# Patient Record
Sex: FEMALE
[De-identification: names, ages, dates, MRNs, and addresses within clinical notes are randomized per-mention and may not be internally consistent; named-entity substitution may affect disease eponyms.]

---

## 2017-02-27 ENCOUNTER — CHARTING TRANS (OUTPATIENT)
Dept: OTHER | Age: 22
End: 2017-02-27

## 2017-03-01 ENCOUNTER — CHARTING TRANS (OUTPATIENT)
Dept: OTHER | Age: 22
End: 2017-03-01

## 2018-07-29 ENCOUNTER — APPOINTMENT (OUTPATIENT)
Dept: GENERAL RADIOLOGY | Age: 23
End: 2018-07-29
Attending: PHYSICIAN ASSISTANT
Payer: COMMERCIAL

## 2018-07-29 ENCOUNTER — HOSPITAL ENCOUNTER (EMERGENCY)
Age: 23
Discharge: HOME | End: 2018-07-29
Payer: COMMERCIAL

## 2018-07-29 VITALS
OXYGEN SATURATION: 99 % | RESPIRATION RATE: 18 BRPM | HEIGHT: 63 IN | BODY MASS INDEX: 35.44 KG/M2 | SYSTOLIC BLOOD PRESSURE: 119 MMHG | WEIGHT: 200 LBS | TEMPERATURE: 98 F | DIASTOLIC BLOOD PRESSURE: 74 MMHG | HEART RATE: 84 BPM

## 2018-07-29 DIAGNOSIS — M62.838 TRAPEZIUS MUSCLE SPASM: ICD-10-CM
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
## 2018-07-29 DIAGNOSIS — S63.92XA HAND SPRAIN, LEFT, INITIAL ENCOUNTER: Primary | ICD-10-CM

## 2018-07-29 DIAGNOSIS — S63.502A SPRAIN OF LEFT WRIST, INITIAL ENCOUNTER: ICD-10-CM

## 2018-07-29 PROCEDURE — 99283 EMERGENCY DEPT VISIT LOW MDM: CPT

## 2018-07-29 PROCEDURE — 73130 X-RAY EXAM OF HAND: CPT | Performed by: PHYSICIAN ASSISTANT

## 2018-07-29 PROCEDURE — 73110 X-RAY EXAM OF WRIST: CPT | Performed by: PHYSICIAN ASSISTANT

## 2018-07-29 RX ORDER — IBUPROFEN 600 MG/1
600 TABLET ORAL EVERY 8 HOURS PRN
Qty: 30 TABLET | Refills: 0 | Status: SHIPPED | OUTPATIENT
Start: 2018-07-29 | End: 2018-08-05

## 2018-07-29 RX ORDER — CYCLOBENZAPRINE HCL 10 MG
10 TABLET ORAL 3 TIMES DAILY PRN
Qty: 20 TABLET | Refills: 0 | Status: SHIPPED | OUTPATIENT
Start: 2018-07-29 | End: 2018-08-05

## 2018-07-29 RX ORDER — IBUPROFEN 600 MG/1
600 TABLET ORAL ONCE
Status: COMPLETED | OUTPATIENT
Start: 2018-07-29 | End: 2018-07-29
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
## 2020-02-08 ENCOUNTER — HOSPITAL ENCOUNTER (EMERGENCY)
Age: 25
Discharge: HOME | End: 2020-02-08
Attending: EMERGENCY MEDICINE
Payer: COMMERCIAL

## 2020-02-08 VITALS
SYSTOLIC BLOOD PRESSURE: 132 MMHG | TEMPERATURE: 99 F | DIASTOLIC BLOOD PRESSURE: 79 MMHG | OXYGEN SATURATION: 100 % | HEART RATE: 64 BPM | RESPIRATION RATE: 16 BRPM | WEIGHT: 200 LBS | BODY MASS INDEX: 35.44 KG/M2 | HEIGHT: 63 IN

## 2020-02-08 DIAGNOSIS — R51.9 FACIAL PAIN: Primary | ICD-10-CM

## 2020-02-08 LAB
POCT LOT NUMBER: NORMAL
POCT URINE PREGNANCY: NEGATIVE

## 2020-02-08 PROCEDURE — 81025 URINE PREGNANCY TEST: CPT

## 2020-02-08 PROCEDURE — 99284 EMERGENCY DEPT VISIT MOD MDM: CPT

## 2020-02-08 PROCEDURE — 96361 HYDRATE IV INFUSION ADD-ON: CPT

## 2020-02-08 PROCEDURE — 96374 THER/PROPH/DIAG INJ IV PUSH: CPT

## 2020-02-08 PROCEDURE — 96375 TX/PRO/DX INJ NEW DRUG ADDON: CPT

## 2020-02-08 RX ORDER — DEXAMETHASONE SODIUM PHOSPHATE 4 MG/ML
10 VIAL (ML) INJECTION ONCE
Status: COMPLETED | OUTPATIENT
Start: 2020-02-08 | End: 2020-02-08

## 2020-02-08 RX ORDER — KETOROLAC TROMETHAMINE 30 MG/ML
15 INJECTION, SOLUTION INTRAMUSCULAR; INTRAVENOUS ONCE
Status: COMPLETED | OUTPATIENT
Start: 2020-02-08 | End: 2020-02-08

## 2020-02-08 RX ORDER — DIPHENHYDRAMINE HYDROCHLORIDE 50 MG/ML
25 INJECTION INTRAMUSCULAR; INTRAVENOUS ONCE
Status: COMPLETED | OUTPATIENT
Start: 2020-02-08 | End: 2020-02-08

## 2020-02-08 RX ORDER — METOCLOPRAMIDE HYDROCHLORIDE 5 MG/ML
5 INJECTION INTRAMUSCULAR; INTRAVENOUS ONCE
Status: COMPLETED | OUTPATIENT
Start: 2020-02-08 | End: 2020-02-08

## 2020-02-08 RX ORDER — SODIUM CHLORIDE 9 MG/ML
125 INJECTION, SOLUTION INTRAVENOUS CONTINUOUS
Status: DISCONTINUED | OUTPATIENT
Start: 2020-02-08 | End: 2020-02-08

## 2020-02-08 NOTE — ED PROVIDER NOTES
Patient Seen in: [facility] Emergency Department In [place]      History   Patient presents with:  Headache  Eye Twitching    Stated Complaint:     HPI    This is a 25 old female presents with right-sided facial pain, eyelid twitching and headache.   Francoise bilaterally. Oropharynx is clear and moist.   Lungs: Clear to auscultation bilaterally with no rales, no retractions, and no wheezing. HEART:  Regular rate and rhythm. S1 and S2. No murmurs, no rubs or gallops.    ABDOMEN: Soft, nontender and nondistended

## 2020-02-08 NOTE — ED INITIAL ASSESSMENT (HPI)
PT to the ED for evaluation of R sided facial pain and eyelid twitching. Pt states the pain is very intense and the twitching is intermittent. PT states she knows she needs dental work but is unsure if this pain is dental in origin.

## 2021-07-27 ENCOUNTER — APPOINTMENT (OUTPATIENT)
Dept: GENERAL RADIOLOGY | Age: 26
End: 2021-07-27
Attending: EMERGENCY MEDICINE
Payer: COMMERCIAL

## 2021-07-27 ENCOUNTER — HOSPITAL ENCOUNTER (EMERGENCY)
Age: 26
Discharge: HOME | End: 2021-07-27
Attending: EMERGENCY MEDICINE
Payer: COMMERCIAL

## 2021-07-27 VITALS
RESPIRATION RATE: 16 BRPM | BODY MASS INDEX: 38.09 KG/M2 | HEIGHT: 63 IN | OXYGEN SATURATION: 99 % | DIASTOLIC BLOOD PRESSURE: 64 MMHG | HEART RATE: 72 BPM | WEIGHT: 215 LBS | SYSTOLIC BLOOD PRESSURE: 107 MMHG | TEMPERATURE: 97 F

## 2021-07-27 DIAGNOSIS — R07.9 ACUTE CHEST PAIN: Primary | ICD-10-CM

## 2021-07-27 LAB
ALBUMIN SERPL-MCNC: 3.9 G/DL (ref 3.4–5)
ALBUMIN/GLOB SERPL: 1 {RATIO} (ref 1–2)
ALP LIVER SERPL-CCNC: 90 U/L
ALT SERPL-CCNC: 41 U/L
ANION GAP SERPL CALC-SCNC: 5 MMOL/L (ref 0–18)
AST SERPL-CCNC: 42 U/L (ref 15–37)
ATRIAL RATE: 66 BPM
B-HCG UR QL: NEGATIVE
BASOPHILS # BLD AUTO: 0.03 X10(3) UL (ref 0–0.2)
BASOPHILS NFR BLD AUTO: 0.3 %
BILIRUB SERPL-MCNC: 0.5 MG/DL (ref 0.1–2)
BILIRUB UR QL STRIP.AUTO: NEGATIVE
BUN BLD-MCNC: 14 MG/DL (ref 7–18)
BUN/CREAT SERPL: 17.7 (ref 10–20)
CALCIUM BLD-MCNC: 8.4 MG/DL (ref 8.5–10.1)
CHLORIDE SERPL-SCNC: 103 MMOL/L (ref 98–112)
CO2 SERPL-SCNC: 27 MMOL/L (ref 21–32)
COLOR UR AUTO: YELLOW
CREAT BLD-MCNC: 0.79 MG/DL
D-DIMER: 0.38 UG/ML FEU (ref ?–0.5)
DEPRECATED RDW RBC AUTO: 39.7 FL (ref 35.1–46.3)
EOSINOPHIL # BLD AUTO: 0.14 X10(3) UL (ref 0–0.7)
EOSINOPHIL NFR BLD AUTO: 1.5 %
ERYTHROCYTE [DISTWIDTH] IN BLOOD BY AUTOMATED COUNT: 12.5 % (ref 11–15)
GLOBULIN PLAS-MCNC: 3.9 G/DL (ref 2.8–4.4)
GLUCOSE BLD-MCNC: 116 MG/DL (ref 70–99)
GLUCOSE UR STRIP.AUTO-MCNC: NEGATIVE MG/DL
HCT VFR BLD AUTO: 43 %
HGB BLD-MCNC: 14 G/DL
IMM GRANULOCYTES # BLD AUTO: 0.06 X10(3) UL (ref 0–1)
IMM GRANULOCYTES NFR BLD: 0.7 %
KETONES UR STRIP.AUTO-MCNC: NEGATIVE MG/DL
LIPASE SERPL-CCNC: 138 U/L (ref 73–393)
LYMPHOCYTES # BLD AUTO: 2.75 X10(3) UL (ref 1–4)
LYMPHOCYTES NFR BLD AUTO: 30 %
M PROTEIN MFR SERPL ELPH: 7.8 G/DL (ref 6.4–8.2)
MCH RBC QN AUTO: 28.5 PG (ref 26–34)
MCHC RBC AUTO-ENTMCNC: 32.6 G/DL (ref 31–37)
MCV RBC AUTO: 87.6 FL
MONOCYTES # BLD AUTO: 0.51 X10(3) UL (ref 0.1–1)
MONOCYTES NFR BLD AUTO: 5.6 %
NEUTROPHILS # BLD AUTO: 5.67 X10 (3) UL (ref 1.5–7.7)
NEUTROPHILS # BLD AUTO: 5.67 X10(3) UL (ref 1.5–7.7)
NEUTROPHILS NFR BLD AUTO: 61.9 %
NITRITE UR QL STRIP.AUTO: NEGATIVE
OSMOLALITY SERPL CALC.SUM OF ELEC: 281 MOSM/KG (ref 275–295)
P AXIS: 28 DEGREES
P-R INTERVAL: 152 MS
PH UR STRIP.AUTO: 7 [PH] (ref 5–8)
PLATELET # BLD AUTO: 301 10(3)UL (ref 150–450)
POTASSIUM SERPL-SCNC: 3.4 MMOL/L (ref 3.5–5.1)
PROT UR STRIP.AUTO-MCNC: NEGATIVE MG/DL
Q-T INTERVAL: 366 MS
QRS DURATION: 84 MS
QTC CALCULATION (BEZET): 383 MS
R AXIS: 47 DEGREES
RBC # BLD AUTO: 4.91 X10(6)UL
RBC UR QL AUTO: NEGATIVE
SARS-COV-2 RNA RESP QL NAA+PROBE: NOT DETECTED
SODIUM SERPL-SCNC: 135 MMOL/L (ref 136–145)
SP GR UR STRIP.AUTO: 1.02 (ref 1–1.03)
T AXIS: 31 DEGREES
TROPONIN I SERPL-MCNC: <0.045 NG/ML (ref ?–0.04)
UROBILINOGEN UR STRIP.AUTO-MCNC: 0.2 MG/DL
VENTRICULAR RATE: 66 BPM
WBC # BLD AUTO: 9.2 X10(3) UL (ref 4–11)

## 2021-07-27 PROCEDURE — 81025 URINE PREGNANCY TEST: CPT

## 2021-07-27 PROCEDURE — 84484 ASSAY OF TROPONIN QUANT: CPT | Performed by: EMERGENCY MEDICINE

## 2021-07-27 PROCEDURE — 93010 ELECTROCARDIOGRAM REPORT: CPT

## 2021-07-27 PROCEDURE — 99285 EMERGENCY DEPT VISIT HI MDM: CPT

## 2021-07-27 PROCEDURE — 96374 THER/PROPH/DIAG INJ IV PUSH: CPT

## 2021-07-27 PROCEDURE — 80053 COMPREHEN METABOLIC PANEL: CPT | Performed by: EMERGENCY MEDICINE

## 2021-07-27 PROCEDURE — 81015 MICROSCOPIC EXAM OF URINE: CPT | Performed by: EMERGENCY MEDICINE

## 2021-07-27 PROCEDURE — 93005 ELECTROCARDIOGRAM TRACING: CPT

## 2021-07-27 PROCEDURE — 85379 FIBRIN DEGRADATION QUANT: CPT | Performed by: EMERGENCY MEDICINE

## 2021-07-27 PROCEDURE — 81001 URINALYSIS AUTO W/SCOPE: CPT | Performed by: EMERGENCY MEDICINE

## 2021-07-27 PROCEDURE — 71045 X-RAY EXAM CHEST 1 VIEW: CPT | Performed by: EMERGENCY MEDICINE

## 2021-07-27 PROCEDURE — 83690 ASSAY OF LIPASE: CPT | Performed by: EMERGENCY MEDICINE

## 2021-07-27 PROCEDURE — 85025 COMPLETE CBC W/AUTO DIFF WBC: CPT | Performed by: EMERGENCY MEDICINE

## 2021-07-27 RX ORDER — NITROFURANTOIN 25; 75 MG/1; MG/1
100 CAPSULE ORAL 2 TIMES DAILY
Qty: 20 CAPSULE | Refills: 0 | Status: SHIPPED | OUTPATIENT
Start: 2021-07-27 | End: 2021-07-27

## 2021-07-27 RX ORDER — PANTOPRAZOLE SODIUM 40 MG/1
40 TABLET, DELAYED RELEASE ORAL
Qty: 28 TABLET | Refills: 0 | Status: SHIPPED | OUTPATIENT
Start: 2021-07-27 | End: 2021-08-10

## 2021-07-27 RX ORDER — KETOROLAC TROMETHAMINE 15 MG/ML
15 INJECTION, SOLUTION INTRAMUSCULAR; INTRAVENOUS ONCE
Status: COMPLETED | OUTPATIENT
Start: 2021-07-27 | End: 2021-07-27

## 2021-07-27 RX ORDER — ESCITALOPRAM OXALATE 10 MG/1
10 TABLET ORAL DAILY
COMMUNITY

## 2021-07-27 RX ORDER — NITROFURANTOIN 25; 75 MG/1; MG/1
100 CAPSULE ORAL 2 TIMES DAILY
Qty: 20 CAPSULE | Refills: 0 | Status: SHIPPED | OUTPATIENT
Start: 2021-07-27 | End: 2021-08-06

## 2021-07-27 RX ORDER — PANTOPRAZOLE SODIUM 40 MG/1
40 TABLET, DELAYED RELEASE ORAL
Qty: 28 TABLET | Refills: 0 | Status: SHIPPED | OUTPATIENT
Start: 2021-07-27 | End: 2021-07-27

## 2021-07-27 NOTE — ED INITIAL ASSESSMENT (HPI)
Since Saturday has had chest and back pain on right side. Worse with eating, and deep breathing.  One episode of vomiting today no carl

## 2021-07-27 NOTE — ED PROVIDER NOTES
Patient Seen in: [facility] Emergency Department In [place]      History   Patient presents with:  Back Pain  Chest Pain Angina    Stated Complaint: chest and back pain with one episode of vomiting since Saturday    HPI/Subjective:   HPI    Is a pleasant rate and rhythm  Abdomen soft nontender with no rebound tenderness noted  Extremity exam shows no clubbing cyanosis or edema  Skin exam shows no rashes or lacerations  Neuro exam shows no focal deficits  Back exam shows no tenderness    ED Course     Labs physician shows no acute disease.   Patient is low risk for ACS and thromboembolic disease patient will be discharged home is to return emerge department she symptoms with complaints is to follow-up department or call  For further evaluation

## 2021-11-21 ENCOUNTER — HOSPITAL ENCOUNTER (EMERGENCY)
Age: 26
Discharge: HOME | End: 2021-11-21
Attending: EMERGENCY MEDICINE
Payer: COMMERCIAL

## 2021-11-21 ENCOUNTER — APPOINTMENT (OUTPATIENT)
Dept: ULTRASOUND IMAGING | Age: 26
End: 2021-11-21
Attending: EMERGENCY MEDICINE
Payer: COMMERCIAL

## 2021-11-21 VITALS
SYSTOLIC BLOOD PRESSURE: 137 MMHG | RESPIRATION RATE: 18 BRPM | OXYGEN SATURATION: 98 % | HEART RATE: 74 BPM | WEIGHT: 216 LBS | BODY MASS INDEX: 38 KG/M2 | DIASTOLIC BLOOD PRESSURE: 79 MMHG | TEMPERATURE: 98 F

## 2021-11-21 DIAGNOSIS — N92.1 MENOMETRORRHAGIA: Primary | ICD-10-CM

## 2021-11-21 PROCEDURE — 81001 URINALYSIS AUTO W/SCOPE: CPT | Performed by: EMERGENCY MEDICINE

## 2021-11-21 PROCEDURE — 76856 US EXAM PELVIC COMPLETE: CPT | Performed by: EMERGENCY MEDICINE

## 2021-11-21 PROCEDURE — 85025 COMPLETE CBC W/AUTO DIFF WBC: CPT | Performed by: EMERGENCY MEDICINE

## 2021-11-21 PROCEDURE — 99284 EMERGENCY DEPT VISIT MOD MDM: CPT

## 2021-11-21 PROCEDURE — 76830 TRANSVAGINAL US NON-OB: CPT | Performed by: EMERGENCY MEDICINE

## 2021-11-21 PROCEDURE — 81025 URINE PREGNANCY TEST: CPT

## 2021-11-21 PROCEDURE — 36415 COLL VENOUS BLD VENIPUNCTURE: CPT

## 2021-11-21 PROCEDURE — 93975 VASCULAR STUDY: CPT | Performed by: EMERGENCY MEDICINE

## 2021-11-21 PROCEDURE — 99285 EMERGENCY DEPT VISIT HI MDM: CPT

## 2021-11-21 PROCEDURE — 80048 BASIC METABOLIC PNL TOTAL CA: CPT | Performed by: EMERGENCY MEDICINE

## 2021-11-21 PROCEDURE — 81015 MICROSCOPIC EXAM OF URINE: CPT | Performed by: EMERGENCY MEDICINE

## 2021-11-21 RX ORDER — TRAZODONE HYDROCHLORIDE 50 MG/1
50 TABLET ORAL NIGHTLY
COMMUNITY

## 2021-11-22 NOTE — ED PROVIDER NOTES
Patient Seen in: [facility] Department In [place]      History   Patient presents with:  Eval-G    Stated Complaint: period for 9 days now    Subjective:   HPI    27-year-old female presents to emergency room for evaluation of vaginal bleeding. Physical Exam    GENERAL: Patient is awake, alert, well-appearing, in no acute distress. HEENT: no scleral icterus. Mucous membranes are moist,  HEART: Regular rate and rhythm, no murmurs. LUNGS: Clear to auscultation bilaterally.   No Rales, no differential includes ectopic pregnancy, miscarriage, dysfunctional bleeding, fibroid, and other         MDM      Patient IV established, blood work obtained. CBC and chemistry are unremarkable. Pelvic ultrasound performed.   I discussed all results with

## 2023-08-27 ENCOUNTER — HOSPITAL ENCOUNTER (EMERGENCY)
Age: 28
Discharge: HOME | End: 2023-08-27
Attending: EMERGENCY MEDICINE
Payer: COMMERCIAL

## 2023-08-27 VITALS
RESPIRATION RATE: 18 BRPM | HEART RATE: 108 BPM | HEIGHT: 63 IN | BODY MASS INDEX: 37.21 KG/M2 | TEMPERATURE: 99 F | WEIGHT: 210 LBS | DIASTOLIC BLOOD PRESSURE: 68 MMHG | SYSTOLIC BLOOD PRESSURE: 111 MMHG | OXYGEN SATURATION: 98 %

## 2023-08-27 DIAGNOSIS — N94.89 LABIAL SWELLING: Primary | ICD-10-CM

## 2023-08-27 LAB — B-HCG UR QL: NEGATIVE

## 2023-08-27 PROCEDURE — 10160 PNXR ASPIR ABSC HMTMA BULLA: CPT

## 2023-08-27 PROCEDURE — 99284 EMERGENCY DEPT VISIT MOD MDM: CPT

## 2023-08-27 PROCEDURE — 99283 EMERGENCY DEPT VISIT LOW MDM: CPT

## 2023-08-27 PROCEDURE — 81025 URINE PREGNANCY TEST: CPT

## 2023-08-27 RX ORDER — CLINDAMYCIN HYDROCHLORIDE 300 MG/1
300 CAPSULE ORAL 4 TIMES DAILY
Qty: 28 CAPSULE | Refills: 0 | Status: SHIPPED | OUTPATIENT
Start: 2023-08-27 | End: 2023-09-03

## 2023-08-27 RX ORDER — DULOXETIN HYDROCHLORIDE 60 MG/1
60 CAPSULE, DELAYED RELEASE ORAL DAILY
COMMUNITY

## 2023-08-27 RX ORDER — CLONIDINE HYDROCHLORIDE 0.2 MG/1
0.2 TABLET ORAL 2 TIMES DAILY
COMMUNITY

## 2023-08-27 NOTE — DISCHARGE INSTRUCTIONS
Follow-up with your gynecologist or the 1 provided within the next week for reassessment. Take the antibiotic as prescribed. Return for increasing pain, fevers or any other concerning symptoms.

## 2023-09-29 PROBLEM — F41.1 GAD (GENERALIZED ANXIETY DISORDER): Status: ACTIVE | Noted: 2023-09-29

## 2023-09-29 PROBLEM — F43.10 PTSD (POST-TRAUMATIC STRESS DISORDER): Status: ACTIVE | Noted: 2023-09-29

## 2023-09-29 PROBLEM — F33.2 MDD (MAJOR DEPRESSIVE DISORDER), RECURRENT SEVERE, WITHOUT PSYCHOSIS (HCC): Status: ACTIVE | Noted: 2023-09-29

## 2023-10-09 PROBLEM — F39 UNSPECIFIED MOOD (AFFECTIVE) DISORDER: Status: ACTIVE | Noted: 2023-10-09

## 2023-10-09 PROBLEM — F10.10 ALCOHOL USE DISORDER, MILD, ABUSE: Status: ACTIVE | Noted: 2023-10-09

## 2023-10-09 PROBLEM — F12.20 CANNABIS USE DISORDER, SEVERE, DEPENDENCE (HCC): Status: ACTIVE | Noted: 2023-10-09

## 2023-10-09 PROBLEM — F39 UNSPECIFIED MOOD (AFFECTIVE) DISORDER (HCC): Status: ACTIVE | Noted: 2023-10-09

## 2023-11-03 PROBLEM — F41.9 ANXIETY DISORDER: Status: ACTIVE | Noted: 2023-11-03

## 2024-01-19 ENCOUNTER — HOSPITAL ENCOUNTER (EMERGENCY)
Age: 29
Discharge: HOME | End: 2024-01-19
Attending: EMERGENCY MEDICINE
Payer: COMMERCIAL

## 2024-01-19 VITALS
RESPIRATION RATE: 18 BRPM | HEIGHT: 63 IN | DIASTOLIC BLOOD PRESSURE: 89 MMHG | WEIGHT: 215 LBS | HEART RATE: 104 BPM | TEMPERATURE: 98 F | SYSTOLIC BLOOD PRESSURE: 128 MMHG | OXYGEN SATURATION: 99 % | BODY MASS INDEX: 38.09 KG/M2

## 2024-01-19 DIAGNOSIS — L03.012 CELLULITIS OF FINGER OF LEFT HAND: Primary | ICD-10-CM

## 2024-01-19 PROCEDURE — 99284 EMERGENCY DEPT VISIT MOD MDM: CPT

## 2024-01-19 PROCEDURE — 99283 EMERGENCY DEPT VISIT LOW MDM: CPT

## 2024-01-19 RX ORDER — CEPHALEXIN 500 MG/1
500 CAPSULE ORAL 3 TIMES DAILY
Qty: 30 CAPSULE | Refills: 0 | Status: SHIPPED | OUTPATIENT
Start: 2024-01-19 | End: 2024-01-29

## 2024-01-19 NOTE — ED PROVIDER NOTES
Patient Seen in: [facility] In [place]      History     Chief Complaint   Patient presents with    Arm or Hand Injury    Cellulitis     Stated Complaint: hand injury    Subjective:   HPI    Patient is a pleasant 28-year-old female here for evaluation of left hand redness, swelling and pain.  Patient states she was aggressively scratching her left hand on Tuesday, 3 days ago.  Over the past 2 days, patient has developed redness, swelling and \"losing dark mariza\" drainage from localized area.  Pain is rated 7 out of 10 in severity.  Patient is not diabetic.  Immunizations are up-to-date per patient report.  Patient does not bite nails.  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.    Patient denies eczema or contact with irritants, insect bite or trauma precipitating pruritic episode.     Denies fevers, chills or sweats.  Denies joint redness, warmth or tenderness.  Mild difficulty with flexion and extension of phalanx due to edema.    Objective:   Past Medical History:   Diagnosis Date    Anxiety     Depression               History reviewed. No pertinent surgical history.             Social History     Socioeconomic History    Marital status: Single   Tobacco Use    Smoking status: Never    Smokeless tobacco: Never   Vaping Use    Vaping Use: Never used   Substance and Sexual Activity    Alcohol use: Yes     Comment: occassional    Drug use: Yes     Types: Cannabis              Review of Systems    Positive for stated complaint: hand injury  Other systems are as noted in HPI.  Constitutional and vital signs reviewed.      All other systems reviewed and negative except as noted above.    Physical Exam     ED Triage Vitals [01/19/24 0828]   /89   Pulse 104   Resp 18   Temp 98.2 °F (36.8 °C)   Temp src Temporal   SpO2 99 %   O2 Device None (Room air)       Current:/89   Pulse 104   Temp 98.2 °F (36.8 °C) (Temporal)   Resp 18   Ht 160 cm (5' 3\")   Wt 97.5 kg   LMP 01/11/2024  (Exact Date)   SpO2 99%   BMI 38.09 kg/m²         Physical Exam  Vitals and nursing note reviewed.   Constitutional:       General: She is not in acute distress.     Appearance: Normal appearance. She is not ill-appearing, toxic-appearing or diaphoretic.   HENT:      Nose: No congestion.      Mouth/Throat:      Mouth: Mucous membranes are moist.   Eyes:      Conjunctiva/sclera: Conjunctivae normal.      Pupils: Pupils are equal, round, and reactive to light.   Cardiovascular:      Rate and Rhythm: Normal rate and regular rhythm.      Pulses: Normal pulses.   Pulmonary:      Effort: Pulmonary effort is normal.   Skin:     Findings: Abrasion and erythema present.      Comments: Left hand superficial abrasion as documented in photo measuring about 5 cm in width by 7 cm in length..  Surrounding cellulitis.  Moderate surrounding edema with mild erythema.  Neurovascular status intact.   Neurological:      Mental Status: She is alert.                       ED Course   Labs Reviewed - No data to display           MDM                             Medical Decision Making  Differentials include but are not limited to cellulitis, impetigo, abscess and septic joint.  Patient is well-appearing, afebrile.  There is no erythema, warmth or edema localized over specific joint.  Will plan to start Keflex and topical Bactroban for cellulitis.  Nonadhesive dressing applied with topical antibiotic ointment here and secured with Coban.  Strict follow-up precautions.  Work note given per patient request.  Patient agrees with plan of care.  All questions answered to patient's satisfaction.    Patient presentation and plan of care reviewed and formulated with Dr. Chris, emergency department physician        Disposition and Plan     Clinical Impression:  1. Cellulitis of finger of left hand         Disposition:  Discharge  1/19/2024  8:51 am    Follow-up:  [facility] in [address]  [id number]  [phone]  Follow up  As needed          Medications Prescribed:  Current Discharge Medication List        START taking these medications    Details   cephalexin 500 MG Oral Cap Take 1 capsule (500 mg total) by mouth 3 (three) times daily for 10 days.  Qty: 30 capsule, Refills: 0      mupirocin 2 % External Ointment Apply 1 Application topically 3 (three) times daily for 10 days.  Qty: 1 each, Refills: 0                                             show

## 2024-01-19 NOTE — ED INITIAL ASSESSMENT (HPI)
Pt to ed with c/o scratch to the top of her left hand that occurred 3 days ago. PT reports she scratched herself with her own nails.

## 2025-01-17 ENCOUNTER — TELEPHONE (OUTPATIENT)
Dept: OBGYN CLINIC | Facility: CLINIC | Age: 30
End: 2025-01-17

## 2025-01-17 DIAGNOSIS — N91.2 AMENORRHEA: Primary | ICD-10-CM

## 2025-01-17 NOTE — TELEPHONE ENCOUNTER
Patient calling to initiate prenatal care  LMP 12/9/24  Patient is 7-8 weeks on 2/3/25  Confirmation of pregnancy appointment scheduled on 2/18/25  Insurance [facility] PPO    Any history of ectopic pregnancy? NO  Any history of miscarriage? NO  Any medications that you are taking on a regular basis other than prenatal vitamins? DULOXETINE,GABEPEATIN, ADDEURALL, CLONAZAPEM (if not taking prenatal vitamins, encourage patient to start taking.)  Any bleeding since the first day of last LMP and your positive pregnancy test? NO  Future Appointments   Date Time Provider Department Center   2/18/2025  9:45 AM [facility] [place]   2/18/2025 10:30 AM Rabia Mccloud MD [facility] [place]   3/4/2025  3:00 PM Jelani Dennison MD [facility] [place]

## 2025-02-04 ENCOUNTER — TELEPHONE (OUTPATIENT)
Dept: OBGYN CLINIC | Facility: CLINIC | Age: 30
End: 2025-02-04

## 2025-02-04 NOTE — TELEPHONE ENCOUNTER
Pt called requesting if she can get a sooner appointment because she is spotting and has questions regarding some medication she is taking. Please advise, thank you.

## 2025-02-05 NOTE — TELEPHONE ENCOUNTER
Called and left a vm for pt. to call back and speak with nurses concerning her questions and concerns.

## 2025-04-13 ENCOUNTER — HOSPITAL ENCOUNTER (EMERGENCY)
Age: 30
Discharge: HOME | End: 2025-04-13
Attending: EMERGENCY MEDICINE
Payer: COMMERCIAL

## 2025-04-13 VITALS
OXYGEN SATURATION: 97 % | HEART RATE: 74 BPM | BODY MASS INDEX: 37.21 KG/M2 | WEIGHT: 210 LBS | DIASTOLIC BLOOD PRESSURE: 62 MMHG | RESPIRATION RATE: 16 BRPM | SYSTOLIC BLOOD PRESSURE: 100 MMHG | HEIGHT: 63 IN | TEMPERATURE: 99 F

## 2025-04-13 DIAGNOSIS — R82.71 ASYMPTOMATIC BACTERIURIA DURING PREGNANCY (HCC): ICD-10-CM

## 2025-04-13 DIAGNOSIS — O21.0 HYPEREMESIS GRAVIDARUM (HCC): Primary | ICD-10-CM

## 2025-04-13 DIAGNOSIS — O99.891 ASYMPTOMATIC BACTERIURIA DURING PREGNANCY (HCC): ICD-10-CM

## 2025-04-13 LAB
ALBUMIN SERPL-MCNC: 4.4 G/DL (ref 3.2–4.8)
ALBUMIN/GLOB SERPL: 1.4 {RATIO} (ref 1–2)
ALP LIVER SERPL-CCNC: 78 U/L (ref 37–98)
ALT SERPL-CCNC: 13 U/L (ref 10–49)
ANION GAP SERPL CALC-SCNC: 8 MMOL/L (ref 0–18)
AST SERPL-CCNC: 26 U/L (ref ?–34)
B-HCG SERPL-ACNC: ABNORMAL MIU/ML (ref ?–4.2)
B-HCG UR QL: POSITIVE
BILIRUB SERPL-MCNC: 0.5 MG/DL (ref 0.3–1.2)
BILIRUB UR QL STRIP.AUTO: NEGATIVE
BUN BLD-MCNC: 6 MG/DL (ref 9–23)
CALCIUM BLD-MCNC: 9.1 MG/DL (ref 8.7–10.6)
CHLORIDE SERPL-SCNC: 104 MMOL/L (ref 98–112)
CLARITY UR REFRACT.AUTO: CLEAR
CO2 SERPL-SCNC: 24 MMOL/L (ref 21–32)
COLOR UR AUTO: YELLOW
CREAT BLD-MCNC: 0.57 MG/DL (ref 0.55–1.02)
EGFRCR SERPLBLD CKD-EPI 2021: 126 ML/MIN/1.73M2 (ref 60–?)
GLOBULIN PLAS-MCNC: 3.2 G/DL (ref 2–3.5)
GLUCOSE BLD-MCNC: 85 MG/DL (ref 70–99)
GLUCOSE UR STRIP.AUTO-MCNC: NEGATIVE MG/DL
NITRITE UR QL STRIP.AUTO: NEGATIVE
OSMOLALITY SERPL CALC.SUM OF ELEC: 279 MOSM/KG (ref 275–295)
PH UR STRIP.AUTO: 7 [PH] (ref 5–8)
POTASSIUM SERPL-SCNC: 3.7 MMOL/L (ref 3.5–5.1)
PROT SERPL-MCNC: 7.6 G/DL (ref 5.7–8.2)
RBC UR QL AUTO: NEGATIVE
SODIUM SERPL-SCNC: 136 MMOL/L (ref 136–145)
SP GR UR STRIP.AUTO: 1.02 (ref 1–1.03)
UROBILINOGEN UR STRIP.AUTO-MCNC: 1 MG/DL

## 2025-04-13 PROCEDURE — 81001 URINALYSIS AUTO W/SCOPE: CPT | Performed by: EMERGENCY MEDICINE

## 2025-04-13 PROCEDURE — 99285 EMERGENCY DEPT VISIT HI MDM: CPT

## 2025-04-13 PROCEDURE — 96361 HYDRATE IV INFUSION ADD-ON: CPT

## 2025-04-13 PROCEDURE — 96374 THER/PROPH/DIAG INJ IV PUSH: CPT

## 2025-04-13 PROCEDURE — 81015 MICROSCOPIC EXAM OF URINE: CPT

## 2025-04-13 PROCEDURE — 87086 URINE CULTURE/COLONY COUNT: CPT

## 2025-04-13 PROCEDURE — 81025 URINE PREGNANCY TEST: CPT

## 2025-04-13 PROCEDURE — 96375 TX/PRO/DX INJ NEW DRUG ADDON: CPT

## 2025-04-13 PROCEDURE — 84702 CHORIONIC GONADOTROPIN TEST: CPT | Performed by: EMERGENCY MEDICINE

## 2025-04-13 PROCEDURE — 80053 COMPREHEN METABOLIC PANEL: CPT | Performed by: EMERGENCY MEDICINE

## 2025-04-13 PROCEDURE — 87086 URINE CULTURE/COLONY COUNT: CPT | Performed by: EMERGENCY MEDICINE

## 2025-04-13 PROCEDURE — 80053 COMPREHEN METABOLIC PANEL: CPT

## 2025-04-13 PROCEDURE — 84702 CHORIONIC GONADOTROPIN TEST: CPT

## 2025-04-13 PROCEDURE — 81001 URINALYSIS AUTO W/SCOPE: CPT

## 2025-04-13 PROCEDURE — 99284 EMERGENCY DEPT VISIT MOD MDM: CPT

## 2025-04-13 RX ORDER — ONDANSETRON 4 MG/1
4 TABLET, ORALLY DISINTEGRATING ORAL EVERY 4 HOURS PRN
Qty: 10 TABLET | Refills: 0 | Status: SHIPPED | OUTPATIENT
Start: 2025-04-13 | End: 2025-04-20

## 2025-04-13 RX ORDER — METOCLOPRAMIDE HYDROCHLORIDE 5 MG/ML
10 INJECTION INTRAMUSCULAR; INTRAVENOUS ONCE
Status: COMPLETED | OUTPATIENT
Start: 2025-04-13 | End: 2025-04-13

## 2025-04-13 RX ORDER — DIPHENHYDRAMINE HYDROCHLORIDE 50 MG/ML
25 INJECTION, SOLUTION INTRAMUSCULAR; INTRAVENOUS ONCE
Status: COMPLETED | OUTPATIENT
Start: 2025-04-13 | End: 2025-04-13

## 2025-04-13 RX ORDER — ONDANSETRON 4 MG/1
4 TABLET, ORALLY DISINTEGRATING ORAL ONCE
Status: COMPLETED | OUTPATIENT
Start: 2025-04-13 | End: 2025-04-13

## 2025-04-13 RX ORDER — CEPHALEXIN 500 MG/1
500 CAPSULE ORAL 4 TIMES DAILY
Qty: 28 CAPSULE | Refills: 0 | Status: SHIPPED | OUTPATIENT
Start: 2025-04-13 | End: 2025-04-20

## 2025-04-13 RX ORDER — DEXTROAMPHETAMINE SACCHARATE, AMPHETAMINE ASPARTATE, DEXTROAMPHETAMINE SULFATE AND AMPHETAMINE SULFATE 1.25; 1.25; 1.25; 1.25 MG/1; MG/1; MG/1; MG/1
25 TABLET ORAL DAILY
COMMUNITY

## 2025-04-13 NOTE — ED PROVIDER NOTES
Patient Seen in: [facility] In [place]    History     Chief Complaint   Patient presents with    Hyperemesis Gravidarum     Stated Complaint: vomiting x 2 weeks + preg at home last menstural in Dec    Subjective:   HPI  29-year-old female who presents to the emergency department says she is been vomiting for the past 2 weeks.  The patient says that she is pregnant at this time.  Says she is been unable to keep any fluids or food down.  She last tried to eat early this morning and vomited 10 minutes after eating.  She denies fevers or chills.  No abdominal pain.  She has been making urine.  Says she been making frequent urine.  Reviewing her records she was seen on 1/19/2024 for cellulitis of her finger of her left hand.  She did have a telephone call for prenatal care on 1/17/2025 as well as 2/4/2025.        Objective:     Past Medical History:    Anxiety    Depression              History reviewed. No pertinent surgical history.             Social History     Socioeconomic History    Marital status: Single   Tobacco Use    Smoking status: Never    Smokeless tobacco: Never   Vaping Use    Vaping status: Never Used   Substance and Sexual Activity    Alcohol use: Not Currently     Comment: occassional    Drug use: Yes     Types: Cannabis                  Physical Exam     ED Triage Vitals [04/13/25 1225]   /84   Pulse 86   Resp 18   Temp 98.6 °F (37 °C)   Temp src Temporal   SpO2 97 %   O2 Device None (Room air)       Current Vitals:   Vital Signs  BP: 112/59  Pulse: 84  Resp: 16  Temp: 99 °F (37.2 °C)  Temp src: Oral    Oxygen Therapy  SpO2: 100 %  O2 Device: None (Room air)        Physical Exam  General: This a pleasant nontoxic appearing patient in no apparent distress alert and oriented ×3  HEENT: Pupils are equal reactive to light.  Extra ocular motions are intact.  No scleral icterus or conjunctival pallor.  Head is atraumatic normocephalic.  Oral mucosa is dry..  Tongue is midline.   No posterior pharyngeal lesions.    Lungs: Clear to auscultation bilaterally.  No wheezes, rhonchi, or rales appreciated.  No accessory muscle use noted for breathing.  Cardiac: Regular rate and rhythm.  Normal S1 and 2 without murmurs or ectopy appreciated  Abdomen: Soft on examination without tenderness to deep palpation or to percussion.  No masses appreciated.  Bowel sounds are normoactive.  No CVA tenderness.  Extremities: No cyanosis, no edema or clubbing.  Pulses are +2.  Full range of motion is noted of the extremities without deformities.  No tenderness.  Neurologically intact.    ED Course     Labs Reviewed   URINALYSIS WITH CULTURE REFLEX - Abnormal; Notable for the following components:       Result Value    Ketones Urine Trace (*)     Protein Urine 30 mg/dL (*)     Urobilinogen Urine 1.0 (*)     Leukocyte Esterase Urine Moderate (*)     All other components within normal limits   UA MICROSCOPIC ONLY, URINE - Abnormal; Notable for the following components:    WBC Urine 11-20 (*)     RBC Urine 3-5 (*)     Bacteria Urine 3+ (*)     Squamous Epi. Cells Many (*)     All other components within normal limits   HCG, BETA SUBUNIT (QUANT PREGNANCY TEST) - Abnormal; Notable for the following components:    Hcg Quantitative 79,967.3 (*)     All other components within normal limits   COMP METABOLIC PANEL (14) - Abnormal; Notable for the following components:    BUN 6 (*)     All other components within normal limits   POCT PREGNANCY URINE - Abnormal; Notable for the following components:    POCT Urine Pregnancy Positive (*)     All other components within normal limits   RAINBOW DRAW LAVENDER   URINE CULTURE, ROUTINE                                 MDM    Differential diagnose includes but is not limited to electrolyte abnormality, kidney failure, hyperemesis gravidarum, urinary tract infection.  Laboratories are sent.  BUN of 6 creatinine of 0.57 she is not in renal failure and electrolytes.  To be intact with  normal potassium as well as normal sodium.  Urine does show 11-20 white blood cells per high-power field with 3-5 red blood cells and +3 bacteria but there is many squamous cells.  This is likely 9 clean-catch but because she is pregnant she would likely require therapy for asymptomatic bacteriuria.  She was given IV fluids.  Zofran.  We will attempt a p.o. challenge.  Patient failed initial p.o. challenge and was given Reglan and Benadryl.  A second p.o. challenge would be started but the patient refused and wanted to be discharged.  She was written for Zofran for home.  She said the Zofran was not working at home but this to be safe during pregnancy.  The patient was discharged.  Also written for antibiotic therapy for asymptomatic bacteria  Note to Patient  The 21st Century Cures Act makes medical notes like these available to patients in the interest of transparency. However, be advised this is a medical document and is intended as hjci-qt-yuin communication; it is written in medical language and may appear blunt, direct, or contain abbreviations or verbiage that are unfamiliar. Medical documents are intended to carry relevant information, facts as evident, and the clinical opinion of the practitioner.  She was discharged in good condition  Medical Decision Making      Disposition and Plan     Clinical Impression:  1. Hyperemesis gravidarum (HCC)    2. Asymptomatic bacteriuria during pregnancy (HCC)         Disposition:  Discharge  4/13/2025  5:44 pm    Follow-up:  Your primary care physician and OB/GYN    Schedule an appointment as soon as possible for a visit in 2 day(s)            Medications Prescribed:  Current Discharge Medication List        START taking these medications    Details   ondansetron 4 MG Oral Tablet Dispersible Take 1 tablet (4 mg total) by mouth every 4 (four) hours as needed for Nausea.  Qty: 10 tablet, Refills: 0      cephALEXin 500 MG Oral Cap Take 1 capsule (500 mg total) by mouth 4  (four) times daily for 7 days.  Qty: 28 capsule, Refills: 0             Supplementary Documentation:

## 2025-04-13 NOTE — ED INITIAL ASSESSMENT (HPI)
Pt had a +pregnancy test last lmp in December, was pregnant had a miscarriage and now pregnant again. Vomiting last 2 weeks

## 2025-04-28 PROBLEM — F90.2 ATTENTION DEFICIT HYPERACTIVITY DISORDER (ADHD), COMBINED TYPE: Status: ACTIVE | Noted: 2025-04-28

## 2025-04-28 PROBLEM — F42.2 MIXED OBSESSIONAL THOUGHTS AND ACTS: Status: ACTIVE | Noted: 2025-04-28

## 2025-05-17 ENCOUNTER — LAB ENCOUNTER (OUTPATIENT)
Dept: LAB | Age: 30
End: 2025-05-17
Attending: Other
Payer: COMMERCIAL

## 2025-05-17 DIAGNOSIS — F10.10 ALCOHOL USE DISORDER, MILD, ABUSE: ICD-10-CM

## 2025-05-17 DIAGNOSIS — F33.2 MDD (MAJOR DEPRESSIVE DISORDER), RECURRENT SEVERE, WITHOUT PSYCHOSIS (HCC): ICD-10-CM

## 2025-05-17 DIAGNOSIS — R41.3 MEMORY DIFFICULTY: ICD-10-CM

## 2025-05-17 DIAGNOSIS — E78.5 HYPERLIPIDEMIA, UNSPECIFIED HYPERLIPIDEMIA TYPE: ICD-10-CM

## 2025-05-17 DIAGNOSIS — Z51.81 THERAPEUTIC DRUG MONITORING: ICD-10-CM

## 2025-05-17 DIAGNOSIS — F41.9 ANXIETY DISORDER, UNSPECIFIED TYPE: ICD-10-CM

## 2025-05-17 LAB
BASOPHILS # BLD AUTO: 0.05 X10(3) UL (ref 0–0.2)
BASOPHILS NFR BLD AUTO: 0.4 %
CHOLEST SERPL-MCNC: 186 MG/DL (ref ?–200)
EOSINOPHIL # BLD AUTO: 0.07 X10(3) UL (ref 0–0.7)
EOSINOPHIL NFR BLD AUTO: 0.5 %
ERYTHROCYTE [DISTWIDTH] IN BLOOD BY AUTOMATED COUNT: 14.3 %
FASTING PATIENT LIPID ANSWER: YES
FOLATE SERPL-MCNC: 24.2 NG/ML (ref 5.4–?)
HCT VFR BLD AUTO: 40.6 % (ref 35–48)
HDLC SERPL-MCNC: 69 MG/DL (ref 40–59)
HGB BLD-MCNC: 13 G/DL (ref 12–16)
IMM GRANULOCYTES # BLD AUTO: 0.17 X10(3) UL (ref 0–1)
IMM GRANULOCYTES NFR BLD: 1.2 %
LDLC SERPL CALC-MCNC: 87 MG/DL (ref ?–100)
LYMPHOCYTES # BLD AUTO: 2.38 X10(3) UL (ref 1–4)
LYMPHOCYTES NFR BLD AUTO: 16.9 %
MCH RBC QN AUTO: 28.1 PG (ref 26–34)
MCHC RBC AUTO-ENTMCNC: 32 G/DL (ref 31–37)
MCV RBC AUTO: 87.7 FL (ref 80–100)
MONOCYTES # BLD AUTO: 0.67 X10(3) UL (ref 0.1–1)
MONOCYTES NFR BLD AUTO: 4.7 %
NEUTROPHILS # BLD AUTO: 10.77 X10 (3) UL (ref 1.5–7.7)
NEUTROPHILS # BLD AUTO: 10.77 X10(3) UL (ref 1.5–7.7)
NEUTROPHILS NFR BLD AUTO: 76.3 %
NONHDLC SERPL-MCNC: 117 MG/DL (ref ?–130)
PLATELET # BLD AUTO: 335 10(3)UL (ref 150–450)
RBC # BLD AUTO: 4.63 X10(6)UL (ref 3.8–5.3)
T4 FREE SERPL-MCNC: 1.3 NG/DL (ref 0.8–1.7)
TRIGL SERPL-MCNC: 182 MG/DL (ref 30–149)
TSI SER-ACNC: 0.44 UIU/ML (ref 0.55–4.78)
VIT B12 SERPL-MCNC: 242 PG/ML (ref 211–911)
VLDLC SERPL CALC-MCNC: 29 MG/DL (ref 0–30)
WBC # BLD AUTO: 14.1 X10(3) UL (ref 4–11)

## 2025-05-17 PROCEDURE — 85025 COMPLETE CBC W/AUTO DIFF WBC: CPT

## 2025-05-17 PROCEDURE — 84439 ASSAY OF FREE THYROXINE: CPT

## 2025-05-17 PROCEDURE — 82607 VITAMIN B-12: CPT

## 2025-05-17 PROCEDURE — 82746 ASSAY OF FOLIC ACID SERUM: CPT

## 2025-05-17 PROCEDURE — 80061 LIPID PANEL: CPT

## 2025-05-17 PROCEDURE — 36415 COLL VENOUS BLD VENIPUNCTURE: CPT

## 2025-05-17 PROCEDURE — 84443 ASSAY THYROID STIM HORMONE: CPT

## 2025-05-21 PROBLEM — Z63.4 BEREAVEMENT: Status: ACTIVE | Noted: 2025-05-21

## 2025-06-30 ENCOUNTER — HOSPITAL ENCOUNTER (EMERGENCY)
Age: 30
Discharge: HOME | End: 2025-06-30
Attending: EMERGENCY MEDICINE
Payer: OTHER MISCELLANEOUS

## 2025-06-30 VITALS
OXYGEN SATURATION: 99 % | DIASTOLIC BLOOD PRESSURE: 78 MMHG | HEIGHT: 63 IN | HEART RATE: 100 BPM | TEMPERATURE: 98 F | RESPIRATION RATE: 18 BRPM | SYSTOLIC BLOOD PRESSURE: 122 MMHG | WEIGHT: 222 LBS | BODY MASS INDEX: 39.34 KG/M2
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
## 2025-06-30 DIAGNOSIS — S60.511A CAT SCRATCH OF RIGHT HAND, INITIAL ENCOUNTER: Primary | ICD-10-CM

## 2025-06-30 PROCEDURE — 99283 EMERGENCY DEPT VISIT LOW MDM: CPT

## 2025-06-30 PROCEDURE — 99282 EMERGENCY DEPT VISIT SF MDM: CPT

## 2025-06-30 NOTE — ED PROVIDER NOTES
I reviewed that chart and discussed the case.  I have examined the patient and noted right upper extremity remedy patient right hand.  Good range of motion.  No bony tenderness.  Patient is 1 cm superficial linear abrasion in webspace of 1st and 2nd digit.  No spreading of wound edges.  No erythema or drainage.    Discussed options with patient she is pregnant will do topical antibiotic ointment dressing and wound check with [facility].  Tetanus is up-to-date.  I did consider cat scratch disease, abrasion, cellulitis     I did the substantive portion of the medical decision making.     I agree with the following clinical impression(s):  Cat scratch of right hand, initial encounter  (primary encounter diagnosis).     I agree with the plan as noted.

## 2025-06-30 NOTE — ED PROVIDER NOTES
Patient Seen in: [facility] In [place]        History  Chief Complaint   Patient presents with    Bite     Stated Complaint: cat scratch. 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  This occurred just prior to arrival patient denies any bites there is 1 small superficial scratch she was sent here for further evaluation.      Objective:     Past Medical History:    Anxiety    Depression              History reviewed. No pertinent surgical history.             Social History     Socioeconomic History    Marital status: Single   Tobacco Use    Smoking status: Never    Smokeless tobacco: Never   Vaping Use    Vaping status: Never Used   Substance and Sexual Activity    Alcohol use: Not Currently     Comment: occassional    Drug use: Not Currently     Types: Cannabis     Comment: cannabis daily, last use 5 days ago     Social Drivers of Health     Food Insecurity: No Food Insecurity (4/24/2025)    NCSS - Food Insecurity     Worried About Running Out of Food in the Last Year: No     Ran Out of Food in the Last Year: No   Transportation Needs: No Transportation Needs (4/24/2025)    NCSS - Transportation     Lack of Transportation: No   Housing Stability: Not At Risk (4/24/2025)    NCSS - Housing/Utilities     Has Housing: Yes     Worried About Losing Housing: No     Unable to Get Utilities: No                                Physical Exam    ED Triage Vitals [06/30/25 1230]   /78   Pulse 100   Resp 18   Temp 98.4 °F (36.9 °C)   Temp src Temporal   SpO2 99 %   O2 Device None (Room air)       Current Vitals:   Vital Signs  BP: 122/78  Pulse: 100  Resp: 18  Temp: 98.4 °F (36.9 °C)  Temp src: Temporal    Oxygen Therapy  SpO2: 99 %  O2 Device: None (Room air)            Physical Exam  Vitals and nursing note reviewed.    Constitutional:       General: She is not in acute distress.     Appearance: Normal appearance. She is well-developed. She is not diaphoretic.   HENT:      Head: Normocephalic and atraumatic.   Cardiovascular:      Rate and Rhythm: Normal rate and regular rhythm.   Pulmonary:      Effort: Pulmonary effort is normal. No respiratory distress.      Breath sounds: Normal breath sounds.   Musculoskeletal:      Right hand: Laceration present.      Cervical back: Normal range of motion.      Comments: Small superficial abrasion between the 1st and 2nd digit   Skin:     General: Skin is warm and dry.      Capillary Refill: Capillary refill takes less than 2 seconds.      Coloration: Skin is not pale.      Findings: No erythema or rash.   Neurological:      Mental Status: She is alert and oriented to person, place, and time.      Motor: No abnormal muscle tone.      Coordination: Coordination normal.      Deep Tendon Reflexes: Reflexes are normal and symmetric.   Psychiatric:         Behavior: Behavior normal.         Thought Content: Thought content normal.         Judgment: Judgment normal.                 ED Course  Labs Reviewed - No data to display                MDM         Medical Decision Making  30-year-old female who comes in today complaining of scratch that occurred By a Stray that is someone's pet that came into the back clinic.  Patient denies concern for rabies, her tetanus is up-to-date per her.  She is approximately 19 weeks pregnant    Problems Addressed:  Cat scratch of right hand, initial encounter: acute illness or injury    Amount and/or Complexity of Data Reviewed  ECG/medicine tests:      Details: Asepsis completed, bacitracin antibiotic drainage were applied  Discussion of management or test interpretation with external provider(s): Discussed with and evaluated the patient with Dr. Huizar who agrees with assessment and plan.    Risk  OTC drugs.  Risk Details: Given superficial nature patient being  pregnant and being a scratch not a bite we will continue to monitor and hold off on prophylactic antibiotics, patient should follow-up with occupational health.  Discussed with her when to return if there is any redness purulent discharge fevers chills or worsening symptoms.        Disposition and Plan     Clinical Impression:  1. Cat scratch of right hand, initial encounter         Disposition:  Discharge  6/30/2025 12:55 pm    Follow-up:  [facility]  [address] [place] [address] Dr Hinson [address] [place] [id number] [phone]  Call in 1 day  Work related injury follow up          Medications Prescribed:  Current Discharge Medication List                Supplementary Documentation:

## 2025-06-30 NOTE — DISCHARGE INSTRUCTIONS
Monitor area if any signs of infection redness fevers chills be reevaluated immediately close follow-up with work comp in 2 days    Area clean and dry, bacitracin or bandage

## (undated) NOTE — LETTER
Date & Time: 1/19/2024, 8:51 AM  Patient: Sindhu Grimm  Encounter Provider(s):    Sonam Chris MD Keane, Mary, APRN       To Whom It May Concern:    Sindhu Grimm was seen and treated in our department on 1/19/2024. She should not return to work until 1/22/24 .    If you have any questions or concerns, please do not hesitate to call.        _____________________________  Physician/APC Signature

## (undated) NOTE — ED AVS SNAPSHOT
Christianjeffery Erwin   MRN: [id number]    Department:  THE [facility] Emergency Department in [place]   Date of Visit:  7/29/2018           Disclosure     Insurance plans vary and the physician(s) referred by the ER may not be covered by your plan.  Please contact tell this physician (or your personal doctor if your instructions are to return to your personal doctor) about any new or lasting problems. The primary care or specialist physician will see patients referred from the [facility] Emergency Department.  Koffi Good

## (undated) NOTE — LETTER
Date & Time: 7/29/2018, 4:40 PM  Patient: Sapna Necessary  Encounter Provider(s):    TWYLA Lugo       To Whom It May Concern:    Sapna Estrada was seen and treated in our department on 7/29/2018. She may return to work on 8/1/2018.     If you

## (undated) NOTE — ED AVS SNAPSHOT
Annie Diana   MRN: [id number]    Department:  THE [facility] Emergency Department in [place]   Date of Visit:  2/8/2020           Disclosure     Insurance plans vary and the physician(s) referred by the ER may not be covered by your plan.  Please contact tell this physician (or your personal doctor if your instructions are to return to your personal doctor) about any new or lasting problems. The primary care or specialist physician will see patients referred from the [facility] Emergency Department.  Johnny Paez

## (undated) NOTE — LETTER
Date & Time: 2/8/2020, 6:05 AM  Patient: Korey Benítez  Encounter Provider(s):    Lary Fitzpatrick DO       To Whom It May Concern:    Korey Benítez was seen and treated in our department on 2/8/2020. She should not return to work until 2/9/20.     If y